# Patient Record
Sex: MALE | Race: OTHER | HISPANIC OR LATINO | URBAN - METROPOLITAN AREA
[De-identification: names, ages, dates, MRNs, and addresses within clinical notes are randomized per-mention and may not be internally consistent; named-entity substitution may affect disease eponyms.]

---

## 2020-01-04 ENCOUNTER — EMERGENCY (EMERGENCY)
Facility: HOSPITAL | Age: 50
LOS: 1 days | Discharge: HOME | End: 2020-01-04
Attending: EMERGENCY MEDICINE
Payer: COMMERCIAL

## 2020-01-04 VITALS
SYSTOLIC BLOOD PRESSURE: 127 MMHG | HEIGHT: 68 IN | WEIGHT: 184.97 LBS | DIASTOLIC BLOOD PRESSURE: 83 MMHG | HEART RATE: 87 BPM | TEMPERATURE: 98 F | RESPIRATION RATE: 20 BRPM | OXYGEN SATURATION: 96 %

## 2020-01-04 PROCEDURE — 99282 EMERGENCY DEPT VISIT SF MDM: CPT

## 2020-01-04 NOTE — ED PROVIDER NOTE - PATIENT PORTAL LINK FT
You can access the FollowMyHealth Patient Portal offered by [facility] by registering at the following website: [email or web address]. By joining [facility]’s FollowMyHealth portal, you will also be able to view your health information using other applications (apps) compatible with our system.

## 2020-01-04 NOTE — ED PROVIDER NOTE - OBJECTIVE STATEMENT
49 year old male no past history states while shaving his pubic hair he sustained small superficial wound to the left side of the base of his penis over vein.  Patient states he was bleeding more prior to coming but since putting pressure bleeding has improved.  No other injuries or complaints.  Unk last tetanus vaccination.

## 2020-01-04 NOTE — ED PROVIDER NOTE - CLINICAL SUMMARY MEDICAL DECISION MAKING FREE TEXT BOX
49 year old male small laceration / superficial puncture wound to base of left penis.  Pressure for hemostasis, tetanus.

## 2020-05-20 ENCOUNTER — TRANSCRIPTION ENCOUNTER (OUTPATIENT)
Age: 50
End: 2020-05-20

## 2022-08-23 PROBLEM — Z78.9 OTHER SPECIFIED HEALTH STATUS: Chronic | Status: ACTIVE | Noted: 2020-01-04

## 2022-08-23 PROBLEM — Z00.00 ENCOUNTER FOR PREVENTIVE HEALTH EXAMINATION: Status: ACTIVE | Noted: 2022-08-23

## 2022-08-29 ENCOUNTER — APPOINTMENT (OUTPATIENT)
Dept: ORTHOPEDIC SURGERY | Facility: CLINIC | Age: 52
End: 2022-08-29

## 2022-09-23 ENCOUNTER — NON-APPOINTMENT (OUTPATIENT)
Age: 52
End: 2022-09-23

## 2022-09-23 ENCOUNTER — APPOINTMENT (OUTPATIENT)
Dept: ORTHOPEDIC SURGERY | Facility: CLINIC | Age: 52
End: 2022-09-23

## 2022-09-23 VITALS — WEIGHT: 185 LBS | BODY MASS INDEX: 28.04 KG/M2 | HEIGHT: 68 IN

## 2022-09-23 DIAGNOSIS — S89.91XA UNSPECIFIED INJURY OF RIGHT LOWER LEG, INITIAL ENCOUNTER: ICD-10-CM

## 2022-09-23 PROCEDURE — 73564 X-RAY EXAM KNEE 4 OR MORE: CPT | Mod: RT

## 2022-09-23 PROCEDURE — 99204 OFFICE O/P NEW MOD 45 MIN: CPT | Mod: 25

## 2022-09-23 PROCEDURE — 20611 DRAIN/INJ JOINT/BURSA W/US: CPT

## 2024-05-20 ENCOUNTER — APPOINTMENT (OUTPATIENT)
Age: 54
End: 2024-05-20
Payer: COMMERCIAL

## 2024-05-20 VITALS
HEIGHT: 68 IN | BODY MASS INDEX: 27.28 KG/M2 | DIASTOLIC BLOOD PRESSURE: 83 MMHG | OXYGEN SATURATION: 96 % | SYSTOLIC BLOOD PRESSURE: 131 MMHG | HEART RATE: 119 BPM | WEIGHT: 180 LBS

## 2024-05-20 DIAGNOSIS — M79.644 PAIN IN RIGHT FINGER(S): ICD-10-CM

## 2024-05-20 PROCEDURE — 99203 OFFICE O/P NEW LOW 30 MIN: CPT

## 2024-05-20 PROCEDURE — 73110 X-RAY EXAM OF WRIST: CPT | Mod: RT

## 2024-05-20 NOTE — PHYSICAL EXAM
[de-identified] : Hand/wrist/finger (right)  Inspection  Swelling: none  Ecchymosis: none  Scissoring: none    Palpation  Tenderness: Mild Location: First CMC joint  Wrist Flexion  Normal.  Wrist Extension  Normal.  Wrist Radial Deviation  Normal.  Wrist Ulnar Deviation  Normal.   Wrist/Pinch/ Motor Strength  Wrist Extension: 5/5 Wrist Flexion: 5/5  : 5/5   Finger Flexion  Normal.  Finger Extension  Normal.  Sensory index  Normal   Special Tests   Finkelsteins: normal  Tinnels/carpal compression: normal  Phalens: normal  Pinch : normal  [de-identified] : XR of right wrist Date: 5/20/2024   Views: 3 views Performed at Health system: Yes Impression: No fracture no dislocation good alignment.  No significant osteoarthritis throughout aside from possible very mild for CMC arthritis.  These images were personally reviewed with original findings documented as above.

## 2024-05-20 NOTE — HISTORY OF PRESENT ILLNESS
[de-identified] : ARMANDO ELMORE is a 54 year old male presenting with diffuse right-sided hand pain and discomfort.  He is right-hand dominant.  He says this pain is acute on chronic and many years ago had similar type of symptoms and had an injection done into the hand but he does not remember the exact location.  States the hand cramps up at times after too much use and feels like he needs to massage it to make it feel better.  Pain can be localized to the base of the thumb and wrist

## 2024-05-20 NOTE — DISCUSSION/SUMMARY
[de-identified] : ARMANDO ELMORE is a 54 year old right-hand-dominant male presenting with acute on chronic right-sided hand and thumb pain consistent with mild first CMC osteoarthritis.  Patient is mostly only symptomatic at the end of the day but this has become significantly debilitating for him.  Given he had good relief with an injection in the past he would like to repeat this and see if he gets relief.  Overall recommend the followin.  Discussed options including bracing and topical medications and oral medications which patient declined as well as PT or home exercises. 2.  Patient will follow-up for for CMC steroid injection

## 2024-05-30 ENCOUNTER — NON-APPOINTMENT (OUTPATIENT)
Age: 54
End: 2024-05-30

## 2024-05-31 ENCOUNTER — APPOINTMENT (OUTPATIENT)
Age: 54
End: 2024-05-31
Payer: COMMERCIAL

## 2024-05-31 VITALS
BODY MASS INDEX: 27.28 KG/M2 | SYSTOLIC BLOOD PRESSURE: 128 MMHG | DIASTOLIC BLOOD PRESSURE: 85 MMHG | HEART RATE: 101 BPM | HEIGHT: 68 IN | WEIGHT: 180 LBS | OXYGEN SATURATION: 97 %

## 2024-05-31 DIAGNOSIS — M18.0 BILATERAL PRIMARY OSTEOARTHRITIS OF FIRST CARPOMETACARPAL JOINTS: ICD-10-CM

## 2024-05-31 PROCEDURE — 20604 DRAIN/INJ JOINT/BURSA W/US: CPT | Mod: RT

## 2024-05-31 NOTE — PROCEDURE
[de-identified] : Ultrasound Guided Injection   Indication: Ensure placement within the first CMC joint utilizing the Sonosite portable ultrasound machine, the Linear 10mm 19-4 MHz transducer, sterile ultrasound gel, ultrasound guidance with the probe in long axis to the joint, utilizing an out-of-plane approach, was used for the following injection: Injection: RIGHT first CMC joint.  Indication: First CMC joint arthritis.  A discussion was had with the patient regarding this procedure and all questions were answered. All risks, benefits and alternatives were discussed. These included but were not limited to bleeding, infection, and allergic reaction. A timeout was performed prior to the procedure to ensure proper side.  Chlorhexidine was used to sterilize the skin overlying the first CMC joint. A 25-gauge 1 inch needle was used to inject 0.5cc 1% Lidocaine, 0.5cc of 40mg/mL Depo-Medrol into the joint from a posterior approach. A sterile bandage was then applied. The patient tolerated the procedure well and there were no complications.  Ultrasound Guided Injection   Indication: Ensure placement within the first CMC joint utilizing the Sonosite portable ultrasound machine, the Linear 10mm 19-4 MHz transducer, sterile ultrasound gel, ultrasound guidance with the probe in long axis to the joint, utilizing an out-of-plane approach, was used for the following injection: Injection: LEFT first CMC joint.  Indication: First CMC joint arthritis.  A discussion was had with the patient regarding this procedure and all questions were answered. All risks, benefits and alternatives were discussed. These included but were not limited to bleeding, infection, and allergic reaction. A timeout was performed prior to the procedure to ensure proper side.  Chlorhexidine was used to sterilize the skin overlying the first CMC Joint A 25-gauge 1 inch needle was used to inject 0.5cc 1% Lidocaine, 0.5cc of 40mg/mL Depo-Medrol into the joint from a posterior approach. A sterile bandage was then applied. The patient tolerated the procedure well and there were no complications.